# Patient Record
Sex: MALE | Race: WHITE
[De-identification: names, ages, dates, MRNs, and addresses within clinical notes are randomized per-mention and may not be internally consistent; named-entity substitution may affect disease eponyms.]

---

## 2019-07-25 ENCOUNTER — HOSPITAL ENCOUNTER (INPATIENT)
Dept: HOSPITAL 35 - CAT | Age: 29
Discharge: HOME | DRG: 153 | End: 2019-07-25
Attending: NURSE PRACTITIONER | Admitting: FAMILY MEDICINE
Payer: COMMERCIAL

## 2019-07-25 VITALS — DIASTOLIC BLOOD PRESSURE: 78 MMHG | SYSTOLIC BLOOD PRESSURE: 141 MMHG

## 2019-07-25 VITALS — HEIGHT: 72.99 IN | WEIGHT: 169.3 LBS | BODY MASS INDEX: 22.44 KG/M2

## 2019-07-25 VITALS — SYSTOLIC BLOOD PRESSURE: 141 MMHG | DIASTOLIC BLOOD PRESSURE: 78 MMHG

## 2019-07-25 DIAGNOSIS — F12.90: ICD-10-CM

## 2019-07-25 DIAGNOSIS — F17.210: ICD-10-CM

## 2019-07-25 DIAGNOSIS — J36: Primary | ICD-10-CM

## 2019-07-25 DIAGNOSIS — Z79.899: ICD-10-CM

## 2019-07-25 LAB
ALBUMIN SERPL-MCNC: 3.2 G/DL (ref 3.4–5)
ALT SERPL-CCNC: 37 U/L (ref 30–65)
ANION GAP SERPL CALC-SCNC: 11 MMOL/L (ref 7–16)
AST SERPL-CCNC: 11 U/L (ref 15–37)
BILIRUB SERPL-MCNC: 0.4 MG/DL
BUN SERPL-MCNC: 22 MG/DL (ref 7–18)
CALCIUM SERPL-MCNC: 9.6 MG/DL (ref 8.5–10.1)
CHLORIDE SERPL-SCNC: 101 MMOL/L (ref 98–107)
CO2 SERPL-SCNC: 28 MMOL/L (ref 21–32)
CREAT SERPL-MCNC: 1 MG/DL (ref 0.7–1.3)
ERYTHROCYTE [DISTWIDTH] IN BLOOD BY AUTOMATED COUNT: 12.3 % (ref 10.5–14.5)
GLUCOSE SERPL-MCNC: 102 MG/DL (ref 74–106)
HCT VFR BLD CALC: 36.7 % (ref 42–52)
HGB BLD-MCNC: 12.4 GM/DL (ref 14–18)
MCH RBC QN AUTO: 30.7 PG (ref 26–34)
MCHC RBC AUTO-ENTMCNC: 33.8 G/DL (ref 28–37)
MCV RBC: 91 FL (ref 80–100)
PLATELET # BLD: 374 THOU/UL (ref 150–400)
POTASSIUM SERPL-SCNC: 3.7 MMOL/L (ref 3.5–5.1)
PROT SERPL-MCNC: 7.9 G/DL (ref 6.4–8.2)
RBC # BLD AUTO: 4.03 MIL/UL (ref 4.5–6)
SODIUM SERPL-SCNC: 140 MMOL/L (ref 136–145)
WBC # BLD AUTO: 13.9 THOU/UL (ref 4–11)

## 2019-07-25 PROCEDURE — 10783: CPT

## 2019-07-25 NOTE — NUR
ASSUMED CARE AT 1100, ADMISSION DONE, MEDS GIVEN, VSS. IV ANTIBIOTICS
GIVEN. WAS SEEN BY DR POLLARD. DISCHARGE ORDER RECEIVED, PERIPHERAL IV WAS TAKEN
OUT. WILL CONTINUE WITH DISCHARGE PLANNING AS NEEDED.